# Patient Record
(demographics unavailable — no encounter records)

---

## 2024-10-17 NOTE — PROCEDURE
[FreeTextEntry3] :  PENOPLASTY FOR DORSAL ROSA CIRCUMCISION PENILE BLOCK [FreeTextEntry5] : none [FreeTextEntry6] :  BANDAGE X 48 HRS BACITRACIN EVERY DIAPER CHANGE AFTER BANDAGE OFF X 2 DAYS THEN SWITCH TO VASELINE/AQUAPHOR X 1 MONTH BATHE 72 HRS FU 2-3 WEEKS (PHOTO OK)

## 2024-10-17 NOTE — CONSULT LETTER
[FreeTextEntry1] :  Dear Dr. YAZMIN COOPER,  Our mutual patient, PHILIPPE NIXON underwent surgery today as outlined below. The procedure went well and he was discharged from the PACU after an uneventful stay. Discharge instructions were provided in writing. Instructions regarding follow up were also provided.   Sincerely,  Romero Diaz MD, FACS, FSPU Chief, Pediatric Urology Professor of Urology and Pediatrics [facility] [employer] at [facility].

## 2024-10-17 NOTE — CONSULT LETTER
[FreeTextEntry1] :  Dear Dr. YAZMIN COOPER,  Our mutual patient, PHILIPPE NIXON underwent surgery today as outlined below. The procedure went well and he was discharged from the PACU after an uneventful stay. Discharge instructions were provided in writing. Instructions regarding follow up were also provided.   Sincerely,  Romero Diaz MD, FACS, FSPU Chief, Pediatric Urology Professor of Urology and Pediatrics [facility] [employer] at [facility].

## 2024-11-16 NOTE — PHYSICAL EXAM

## 2024-11-16 NOTE — DEVELOPMENTAL MILESTONES
[Normal Development] : Normal Development [Uses basic gestures] : uses basic gestures [Says "Luc" or "Mama"] : says "Luc" or "Mama" nonspecifically [Sits well without support] : sits well without support [Transitions between sitting and lying] : transitions between sitting and lying [Balances on hands and knees] : balances on hands and knees [Picks up small objects with 3 fingers] : picks up small objects with 3 fingers and thumb [Releases objects intentionally] : releases objects intentionally [[place] objects together] : bangs objects together [Yes] : Completed.

## 2024-11-16 NOTE — DISCUSSION/SUMMARY
[Normal Growth] : growth [Normal Development] : development [None] : No known medical problems [No Elimination Concerns] : elimination [No Feeding Concerns] : feeding [No Skin Concerns] : skin [Normal Sleep Pattern] : sleep [Family Adaptation] : family adaptation [Infant [place]] : infant independence [Feeding Routine] : feeding routine [Safety] : safety [No Medications] : ~He/She~ is not on any medications [Parent/Guardian] : parent/guardian [] : The components of the vaccine(s) to be administered today are listed in the plan of care. The disease(s) for which the vaccine(s) are intended to prevent and the risks have been discussed with the caretaker.  The risks are also included in the appropriate vaccination information statements which have been provided to the patient's caregiver.  The caregiver has given consent to vaccinate. [FreeTextEntry1] : 9 month old for WCC visit. Well appearing infant on exam in no acute distress. Age-appropriate anticipatory guidance provided.   -Vaccines as ordered. Flu vaccine deferred.  -RTC for next WCC visit/sooner if any concerns arise

## 2024-11-16 NOTE — HISTORY OF PRESENT ILLNESS
[Parents] : parents [Well-balanced] : well-balanced [Normal] : Normal [In Crib] : sleeps in crib [On back] : sleeps on back [Co-sleeping] : no co-sleeping [Wakes up at night] : does not wake up at night [Sleeps 12-16 hours per 24 hours (including naps)] : sleeps 12-16 hours per 24 hours (including naps) [Pacifier use] : Pacifier use [No] : No cigarette smoke exposure [Water heater temperature set at <120 degrees F] : Water heater temperature set at <120 degrees F [Rear facing car seat in  back seat] : Rear facing car seat in  back seat [Carbon Monoxide Detectors] : Carbon monoxide detectors [Smoke Detectors] : Smoke detectors [FreeTextEntry7] : 9 month old [facility] visit  [de-identified] : Runny nose and cough that started 1 week ago, improving. No fevers.

## 2024-11-25 NOTE — HISTORY OF PRESENT ILLNESS
[de-identified] : Fever  [FreeTextEntry6] : 9 month old male presenting with 2 days of fever. Tmax of 101F at home. Associated with runny nose and mild cough. No vomiting, diarrhea, rash. Taking PO well. Remains playful and energetic when afebrile. Making multiple voids per day. No difficulty breathing reported.

## 2024-11-25 NOTE — DISCUSSION/SUMMARY
[FreeTextEntry1] : 9 month old male presenting with fever, runny nose, and cough. On exam, well appearing and playful infant at this time with no acute distress.   Viral URI -Recommend supportive care, nasal suctioning PRN -Call/RTC if new/worsening/persistent symptoms for re-evaluation

## 2024-11-25 NOTE — HISTORY OF PRESENT ILLNESS
[de-identified] : Fever  [FreeTextEntry6] : 9 month old male presenting with 2 days of fever. Tmax of 101F at home. Associated with runny nose and mild cough. No vomiting, diarrhea, rash. Taking PO well. Remains playful and energetic when afebrile. Making multiple voids per day. No difficulty breathing reported.

## 2025-03-14 NOTE — PHYSICAL EXAM
[Alert] : alert [Normocephalic] : normocephalic [Closed Anterior [place]] : closed anterior fontanelle [Red Reflex] : red reflex bilateral [PERRL] : PERRL [Normally Placed Ears] : normally placed ears [Auricles Well Formed] : auricles well formed [Clear Tympanic membranes] : clear tympanic membranes [Light reflex present] : light reflex present [Bony landmarks visible] : bony landmarks visible [Discharge] : no discharge [Nares Patent] : nares patent [Palate Intact] : palate intact [Uvula Midline] : uvula midline [Tooth Eruption] : tooth eruption [Supple, full passive range of motion] : supple, full passive range of motion [Palpable Masses] : no palpable masses [Symmetric Chest Rise] : symmetric chest rise [Clear to Auscultation Bilaterally] : clear to auscultation bilaterally [Regular Rate and Rhythm] : regular rate and rhythm [S1, S2 present] : S1, S2 present [Murmurs] : no murmurs [+2 Femoral Pulses] : (+) 2 femoral pulses [Soft] : soft [Tender] : nontender [Distended] : nondistended [Bowel Sounds] : normoactive bowel sounds [Hepatomegaly] : no hepatomegaly [Splenomegaly] : no splenomegaly [Central Urethral Opening] : central urethral opening [Testicles Descended] : testicles descended bilaterally [No Abnormal Lymph Nodes Palpated] : no abnormal lymph nodes palpated [Symmetric Abduction and Rotation of Hips] : symmetric abduction and rotation of hips [Allis Sign] : negative Allis sign [Straight] : straight [Cranial Nerves Grossly Intact] : cranial nerves grossly intact [Rash or Lesions] : no rash/lesions

## 2025-03-14 NOTE — DISCUSSION/SUMMARY
[Family Support] : family support [Establishing Routines] : establishing routines [Feeding and Appetite Changes] : feeding and appetite changes [Establishing A Dental Home] : establishing a dental home [Safety] : safety [] : The components of the vaccine(s) to be administered today are listed in the plan of care. The disease(s) for which the vaccine(s) are intended to prevent and the risks have been discussed with the caretaker.  The risks are also included in the appropriate vaccination information statements which have been provided to the patient's caregiver.  The caregiver has given consent to vaccinate. [FreeTextEntry1] : 12 month old for WCC visit. Well appearing child on exam in no acute distress. Age-appropriate anticipatory guidance provided.   -Vaccines as ordered -Labs as ordered -Constipation: Recommend increased dietary fiber and probiotic. Advised using polyethylene glycol, titrating to effect. Return if symptoms worsen or persist.  -RTC for next WCC visit/sooner if any concerns arise

## 2025-03-14 NOTE — HISTORY OF PRESENT ILLNESS
[Mother] : mother [Cow's milk ___ oz/feed] : [unfilled] oz of Cow's milk per feed [Fruit] : fruit [Vegetables] : vegetables [Meat] : meat [Dairy] : dairy [Normal] : Normal [Tap water] : Primary Fluoride Source: Tap water [Playtime] : Playtime  [No] : No cigarette smoke exposure [Water heater temperature set at <120 degrees F] : Water heater temperature set at <120 degrees F [Car seat in back seat] : Car seat in back seat [Smoke Detectors] : Smoke detectors [Carbon Monoxide Detectors] : Carbon monoxide detectors [At risk for exposure to TB] : Not at risk for exposure to Tuberculosis [FreeTextEntry7] : 12 month [facility] visit  [de-identified] : Hard stools, strains and seems uncomfortable and cries. Mother has tried prune and pear juice but does not work

## 2025-03-14 NOTE — DEVELOPMENTAL MILESTONES
[Looks for hidden objects] : looks for hidden objects [Imitates new gestures] : imitates new gestures [Says "Dad" or "Mom" with meaning] : says "Dad" or "Mom" with meaning [Uses one word other than Mom or] : does not use one word other than Mom or Dad or personal names [Follows a verbal command that] : follows a verbal command that includes a gesture [Takes first independent] : does not take first independent steps [Stands without support] : stands without support [Drops object in a cup] : drops object in a cup [Picks up small object with 2 finger] : picks up small object with 2 finger pincer grasp [Picks up food and eats it] : picks up food and eats it

## 2025-06-13 NOTE — HISTORY OF PRESENT ILLNESS
[Mother] : mother [Cow's milk (Ounces per day ___)] : consumes [unfilled] oz of cow's milk per day [Fruit] : fruit [Vegetables] : vegetables [Meat] : meat [Normal] : Normal [Brushing teeth] : Brushing teeth [Toothpaste] : Primary Fluoride Source: Toothpaste [Playtime] : Playtime [No] : No cigarette smoke exposure [Water heater temperature set at <120 degrees F] : Water heater temperature set at <120 degrees F [Car seat in back seat] : Car seat in back seat [Carbon Monoxide Detectors] : Carbon monoxide detectors [Smoke Detectors] : Smoke detectors

## 2025-06-13 NOTE — DISCUSSION/SUMMARY
[Communication and Social Development] : communication and social development [Sleep Routines and Issues] : sleep routines and issues [Temper Tantrums and Discipline] : temper tantrums and discipline [Healthy Teeth] : healthy teeth [Safety] : safety [] : The components of the vaccine(s) to be administered today are listed in the plan of care. The disease(s) for which the vaccine(s) are intended to prevent and the risks have been discussed with the caretaker.  The risks are also included in the appropriate vaccination information statements which have been provided to the patient's caregiver.  The caregiver has given consent to vaccinate. [FreeTextEntry1] :  15 month old for WCC visit. Well appearing child on exam in no acute distress. Age-appropriate anticipatory guidance provided. Growth and development appropriate for age  -Vaccines as ordered: Pentacel, Prevnar -RTC for next WCC visit/sooner if any concerns arise

## 2025-06-13 NOTE — DEVELOPMENTAL MILESTONES
[Normal Development] : Normal Development [Drinks from cup with little] : drinks from cup with little spilling [Points to ask for something] : points to ask for something or to get help [Uses 3 words other than names] : uses 3 words other than names [Speaks in sounds that seem like] : speaks in sounds that seem like an unknown language [Follows directions that do not] : follows direction that do not include a gesture [Looks when parent says,] : looks when parent says, "Where is...?" [Squats to  objects] : squats to  objects [Crawls up a few steps] : crawls up a few steps [Begins to run] : begins to run [Drops object into and takes object] : drops object into and takes object out of container

## 2025-06-13 NOTE — PHYSICAL EXAM
[Alert] : alert [No Acute Distress] : no acute distress [Red Reflex Bilateral] : red reflex bilateral [PERRL] : PERRL [Normally Placed Ears] : normally placed ears [Auricles Well Formed] : auricles well formed [Clear Tympanic membranes with present light reflex and bony landmarks] : clear tympanic membranes with present light reflex and bony landmarks [No Discharge] : no discharge [Nares Patent] : nares patent [Palate Intact] : palate intact [Uvula Midline] : uvula midline [Tooth Eruption] : tooth eruption  [Supple, full passive range of motion] : supple, full passive range of motion [No Palpable Masses] : no palpable masses [Symmetric Chest Rise] : symmetric chest rise [Clear to Auscultation Bilaterally] : clear to auscultation bilaterally [Regular Rate and Rhythm] : regular rate and rhythm [S1, S2 present] : S1, S2 present [No Murmurs] : no murmurs [Soft] : soft [+2 Femoral Pulses] : +2 femoral pulses [NonTender] : non tender [Normoactive Bowel Sounds] : normoactive bowel sounds [Non Distended] : non distended [No Hepatomegaly] : no hepatomegaly [No Splenomegaly] : no splenomegaly [Central Urethral Opening] : central urethral opening [Testicles Descended Bilaterally] : testicles descended bilaterally [Patent] : patent [Normally Placed] : normally placed [No Abnormal Lymph Nodes Palpated] : no abnormal lymph nodes palpated [No Clavicular Crepitus] : no clavicular crepitus [Symmetric Buttocks Creases] : symmetric buttocks creases [No Spinal Dimple] : no spinal dimple [NoTuft of Hair] : no tuft of hair [Cranial Nerves Grossly Intact] : cranial nerves grossly intact [No Rash or Lesions] : no rash or lesions